# Patient Record
Sex: MALE | Race: OTHER | Employment: UNEMPLOYED | URBAN - METROPOLITAN AREA
[De-identification: names, ages, dates, MRNs, and addresses within clinical notes are randomized per-mention and may not be internally consistent; named-entity substitution may affect disease eponyms.]

---

## 2023-03-24 ENCOUNTER — APPOINTMENT (OUTPATIENT)
Dept: GENERAL RADIOLOGY | Age: 18
DRG: 862 | End: 2023-03-24
Attending: EMERGENCY MEDICINE
Payer: MEDICAID

## 2023-03-24 ENCOUNTER — HOSPITAL ENCOUNTER (INPATIENT)
Age: 18
LOS: 2 days | Discharge: HOME | DRG: 862 | End: 2023-03-27
Attending: EMERGENCY MEDICINE | Admitting: ANESTHESIOLOGY
Payer: MEDICAID

## 2023-03-24 DIAGNOSIS — D64.9 ANEMIA, UNSPECIFIED TYPE: ICD-10-CM

## 2023-03-24 DIAGNOSIS — K92.2 ACUTE UPPER GI BLEEDING: Primary | ICD-10-CM

## 2023-03-24 LAB
ALBUMIN SERPL-MCNC: 2.6 G/DL (ref 3.5–5)
ALBUMIN/GLOB SERPL: 0.7 (ref 1.1–2.2)
ALP SERPL-CCNC: 143 U/L (ref 45–117)
ALT SERPL-CCNC: 39 U/L (ref 12–78)
ANION GAP SERPL CALC-SCNC: 12 MMOL/L (ref 5–15)
AST SERPL-CCNC: 16 U/L (ref 15–37)
BASOPHILS # BLD: 0.1 K/UL (ref 0–0.1)
BASOPHILS NFR BLD: 1 % (ref 0–1)
BILIRUB SERPL-MCNC: 0.8 MG/DL (ref 0.2–1)
BUN SERPL-MCNC: 20 MG/DL (ref 6–20)
BUN/CREAT SERPL: 24 (ref 12–20)
CALCIUM SERPL-MCNC: 8 MG/DL (ref 8.5–10.1)
CHLORIDE SERPL-SCNC: 105 MMOL/L (ref 97–108)
CO2 SERPL-SCNC: 23 MMOL/L (ref 21–32)
CREAT SERPL-MCNC: 0.85 MG/DL (ref 0.7–1.3)
DIFFERENTIAL METHOD BLD: ABNORMAL
EOSINOPHIL # BLD: 0.4 K/UL (ref 0–0.4)
EOSINOPHIL NFR BLD: 3 % (ref 0–7)
ERYTHROCYTE [DISTWIDTH] IN BLOOD BY AUTOMATED COUNT: 13.9 % (ref 11.5–14.5)
GLOBULIN SER CALC-MCNC: 3.6 G/DL (ref 2–4)
GLUCOSE SERPL-MCNC: 177 MG/DL (ref 65–100)
HCT VFR BLD AUTO: 24.8 % (ref 36.6–50.3)
HGB BLD-MCNC: 7.5 G/DL (ref 12.1–17)
IMM GRANULOCYTES # BLD AUTO: 0 K/UL
IMM GRANULOCYTES NFR BLD AUTO: 0 %
INR PPP: 1.3 (ref 0.9–1.1)
LYMPHOCYTES # BLD: 5.6 K/UL (ref 0.8–3.5)
LYMPHOCYTES NFR BLD: 40 % (ref 12–49)
MCH RBC QN AUTO: 26.4 PG (ref 26–34)
MCHC RBC AUTO-ENTMCNC: 30.2 G/DL (ref 30–36.5)
MCV RBC AUTO: 87.3 FL (ref 80–99)
MONOCYTES # BLD: 1.1 K/UL (ref 0–1)
MONOCYTES NFR BLD: 8 % (ref 5–13)
NEUTS SEG # BLD: 6.9 K/UL (ref 1.8–8)
NEUTS SEG NFR BLD: 48 % (ref 32–75)
NRBC # BLD: 0 K/UL (ref 0–0.01)
NRBC BLD-RTO: 0 PER 100 WBC
PLATELET # BLD AUTO: 458 K/UL (ref 150–400)
PMV BLD AUTO: 10.1 FL (ref 8.9–12.9)
POTASSIUM SERPL-SCNC: 4.1 MMOL/L (ref 3.5–5.1)
PROT SERPL-MCNC: 6.2 G/DL (ref 6.4–8.2)
PROTHROMBIN TIME: 12.5 SEC (ref 9–11.1)
RBC # BLD AUTO: 2.84 M/UL (ref 4.1–5.7)
RBC MORPH BLD: ABNORMAL
SODIUM SERPL-SCNC: 140 MMOL/L (ref 136–145)
WBC # BLD AUTO: 14.1 K/UL (ref 4.1–11.1)

## 2023-03-24 PROCEDURE — 96361 HYDRATE IV INFUSION ADD-ON: CPT

## 2023-03-24 PROCEDURE — P9016 RBC LEUKOCYTES REDUCED: HCPCS

## 2023-03-24 PROCEDURE — 85025 COMPLETE CBC W/AUTO DIFF WBC: CPT

## 2023-03-24 PROCEDURE — 86900 BLOOD TYPING SEROLOGIC ABO: CPT

## 2023-03-24 PROCEDURE — 74011250636 HC RX REV CODE- 250/636: Performed by: EMERGENCY MEDICINE

## 2023-03-24 PROCEDURE — 86923 COMPATIBILITY TEST ELECTRIC: CPT

## 2023-03-24 PROCEDURE — 36415 COLL VENOUS BLD VENIPUNCTURE: CPT

## 2023-03-24 PROCEDURE — 99285 EMERGENCY DEPT VISIT HI MDM: CPT

## 2023-03-24 PROCEDURE — 80053 COMPREHEN METABOLIC PANEL: CPT

## 2023-03-24 PROCEDURE — 86920 COMPATIBILITY TEST SPIN: CPT

## 2023-03-24 PROCEDURE — 85610 PROTHROMBIN TIME: CPT

## 2023-03-24 PROCEDURE — 36430 TRANSFUSION BLD/BLD COMPNT: CPT

## 2023-03-24 PROCEDURE — 96360 HYDRATION IV INFUSION INIT: CPT

## 2023-03-24 PROCEDURE — 71045 X-RAY EXAM CHEST 1 VIEW: CPT

## 2023-03-24 RX ORDER — SODIUM CHLORIDE 9 MG/ML
250 INJECTION, SOLUTION INTRAVENOUS AS NEEDED
Status: DISCONTINUED | OUTPATIENT
Start: 2023-03-24 | End: 2023-03-27 | Stop reason: HOSPADM

## 2023-03-24 RX ADMIN — SODIUM CHLORIDE 250 ML: 9 INJECTION, SOLUTION INTRAVENOUS at 22:30

## 2023-03-24 RX ADMIN — SODIUM CHLORIDE 250 ML: 9 INJECTION, SOLUTION INTRAVENOUS at 23:20

## 2023-03-25 ENCOUNTER — APPOINTMENT (OUTPATIENT)
Dept: CT IMAGING | Age: 18
DRG: 862 | End: 2023-03-25
Attending: EMERGENCY MEDICINE
Payer: MEDICAID

## 2023-03-25 PROBLEM — K92.2 GIB (GASTROINTESTINAL BLEEDING): Status: ACTIVE | Noted: 2023-03-25

## 2023-03-25 LAB
ABO + RH BLD: NORMAL
ALBUMIN SERPL-MCNC: 2.2 G/DL (ref 3.5–5)
ALBUMIN/GLOB SERPL: 0.7 (ref 1.1–2.2)
ALP SERPL-CCNC: 118 U/L (ref 45–117)
ALT SERPL-CCNC: 28 U/L (ref 12–78)
ANION GAP SERPL CALC-SCNC: 6 MMOL/L (ref 5–15)
APTT PPP: 26.6 SEC (ref 22.1–31)
AST SERPL-CCNC: 14 U/L (ref 15–37)
BILIRUB SERPL-MCNC: 0.9 MG/DL (ref 0.2–1)
BLD PROD TYP BPU: NORMAL
BLD PROD TYP BPU: NORMAL
BLOOD GROUP ANTIBODIES SERPL: NORMAL
BPU ID: NORMAL
BPU ID: NORMAL
BUN SERPL-MCNC: 20 MG/DL (ref 6–20)
BUN/CREAT SERPL: 36 (ref 12–20)
CALCIUM SERPL-MCNC: 7.5 MG/DL (ref 8.5–10.1)
CHLORIDE SERPL-SCNC: 115 MMOL/L (ref 97–108)
CO2 SERPL-SCNC: 19 MMOL/L (ref 21–32)
COMMENT, HOLDF: NORMAL
CREAT SERPL-MCNC: 0.55 MG/DL (ref 0.7–1.3)
CROSSMATCH RESULT,%XM: NORMAL
ERYTHROCYTE [DISTWIDTH] IN BLOOD BY AUTOMATED COUNT: 14.5 % (ref 11.5–14.5)
FIBRINOGEN PPP-MCNC: 281 MG/DL (ref 200–475)
GLOBULIN SER CALC-MCNC: 3 G/DL (ref 2–4)
GLUCOSE SERPL-MCNC: 124 MG/DL (ref 65–100)
HCT VFR BLD AUTO: 34 % (ref 36.6–50.3)
HGB BLD-MCNC: 10.6 G/DL (ref 12.1–17)
HGB BLD-MCNC: 7.1 G/DL (ref 12.1–17)
HGB BLD-MCNC: 7.8 G/DL (ref 12.1–17)
INR PPP: 1.1 (ref 0.9–1.1)
MCH RBC QN AUTO: 28.3 PG (ref 26–34)
MCHC RBC AUTO-ENTMCNC: 31.2 G/DL (ref 30–36.5)
MCV RBC AUTO: 90.9 FL (ref 80–99)
NRBC # BLD: 0 K/UL (ref 0–0.01)
NRBC BLD-RTO: 0 PER 100 WBC
PLATELET # BLD AUTO: 185 K/UL (ref 150–400)
PMV BLD AUTO: 9.9 FL (ref 8.9–12.9)
POTASSIUM SERPL-SCNC: 4.5 MMOL/L (ref 3.5–5.1)
PROT SERPL-MCNC: 5.2 G/DL (ref 6.4–8.2)
PROTHROMBIN TIME: 11.4 SEC (ref 9–11.1)
RBC # BLD AUTO: 3.74 M/UL (ref 4.1–5.7)
SAMPLES BEING HELD,HOLD: NORMAL
SODIUM SERPL-SCNC: 140 MMOL/L (ref 136–145)
SPECIMEN EXP DATE BLD: NORMAL
STATUS OF UNIT,%ST: NORMAL
STATUS OF UNIT,%ST: NORMAL
THERAPEUTIC RANGE,PTTT: NORMAL SECS (ref 58–77)
UNIT DIVISION, %UDIV: 0
UNIT DIVISION, %UDIV: 0
WBC # BLD AUTO: 9.5 K/UL (ref 4.1–11.1)

## 2023-03-25 PROCEDURE — 85384 FIBRINOGEN ACTIVITY: CPT

## 2023-03-25 PROCEDURE — 85610 PROTHROMBIN TIME: CPT

## 2023-03-25 PROCEDURE — 74178 CT ABD&PLV WO CNTR FLWD CNTR: CPT

## 2023-03-25 PROCEDURE — 74011250636 HC RX REV CODE- 250/636: Performed by: NURSE PRACTITIONER

## 2023-03-25 PROCEDURE — 65610000006 HC RM INTENSIVE CARE

## 2023-03-25 PROCEDURE — 74011000636 HC RX REV CODE- 636

## 2023-03-25 PROCEDURE — P9016 RBC LEUKOCYTES REDUCED: HCPCS

## 2023-03-25 PROCEDURE — 80053 COMPREHEN METABOLIC PANEL: CPT

## 2023-03-25 PROCEDURE — 36415 COLL VENOUS BLD VENIPUNCTURE: CPT

## 2023-03-25 PROCEDURE — 93005 ELECTROCARDIOGRAM TRACING: CPT

## 2023-03-25 PROCEDURE — 74011250636 HC RX REV CODE- 250/636: Performed by: INTERNAL MEDICINE

## 2023-03-25 PROCEDURE — 36430 TRANSFUSION BLD/BLD COMPNT: CPT

## 2023-03-25 PROCEDURE — C9113 INJ PANTOPRAZOLE SODIUM, VIA: HCPCS | Performed by: INTERNAL MEDICINE

## 2023-03-25 PROCEDURE — 85730 THROMBOPLASTIN TIME PARTIAL: CPT

## 2023-03-25 PROCEDURE — 85018 HEMOGLOBIN: CPT

## 2023-03-25 PROCEDURE — 30233N1 TRANSFUSION OF NONAUTOLOGOUS RED BLOOD CELLS INTO PERIPHERAL VEIN, PERCUTANEOUS APPROACH: ICD-10-PCS | Performed by: ANESTHESIOLOGY

## 2023-03-25 PROCEDURE — 74011000636 HC RX REV CODE- 636: Performed by: RADIOLOGY

## 2023-03-25 PROCEDURE — P9012 CRYOPRECIPITATE EACH UNIT: HCPCS

## 2023-03-25 PROCEDURE — P9059 PLASMA, FRZ BETWEEN 8-24HOUR: HCPCS

## 2023-03-25 PROCEDURE — 85027 COMPLETE CBC AUTOMATED: CPT

## 2023-03-25 PROCEDURE — 74011000250 HC RX REV CODE- 250: Performed by: NURSE PRACTITIONER

## 2023-03-25 PROCEDURE — 74011000250 HC RX REV CODE- 250: Performed by: INTERNAL MEDICINE

## 2023-03-25 RX ORDER — ONDANSETRON 2 MG/ML
4 INJECTION INTRAMUSCULAR; INTRAVENOUS
Status: DISCONTINUED | OUTPATIENT
Start: 2023-03-25 | End: 2023-03-27 | Stop reason: HOSPADM

## 2023-03-25 RX ORDER — CALCIUM GLUCONATE 20 MG/ML
2 INJECTION, SOLUTION INTRAVENOUS
Status: COMPLETED | OUTPATIENT
Start: 2023-03-25 | End: 2023-03-25

## 2023-03-25 RX ORDER — ACETAMINOPHEN 650 MG/1
650 SUPPOSITORY RECTAL
Status: DISCONTINUED | OUTPATIENT
Start: 2023-03-25 | End: 2023-03-27 | Stop reason: HOSPADM

## 2023-03-25 RX ORDER — SODIUM CHLORIDE 0.9 % (FLUSH) 0.9 %
5-40 SYRINGE (ML) INJECTION EVERY 8 HOURS
Status: DISCONTINUED | OUTPATIENT
Start: 2023-03-25 | End: 2023-03-27 | Stop reason: HOSPADM

## 2023-03-25 RX ORDER — SODIUM CHLORIDE 0.9 % (FLUSH) 0.9 %
5-40 SYRINGE (ML) INJECTION AS NEEDED
Status: DISCONTINUED | OUTPATIENT
Start: 2023-03-25 | End: 2023-03-27 | Stop reason: HOSPADM

## 2023-03-25 RX ORDER — SODIUM CHLORIDE 9 MG/ML
250 INJECTION, SOLUTION INTRAVENOUS AS NEEDED
Status: DISCONTINUED | OUTPATIENT
Start: 2023-03-25 | End: 2023-03-27 | Stop reason: HOSPADM

## 2023-03-25 RX ORDER — ACETAMINOPHEN 325 MG/1
650 TABLET ORAL
Status: DISCONTINUED | OUTPATIENT
Start: 2023-03-25 | End: 2023-03-27 | Stop reason: HOSPADM

## 2023-03-25 RX ORDER — METOCLOPRAMIDE HYDROCHLORIDE 5 MG/ML
10 INJECTION INTRAMUSCULAR; INTRAVENOUS EVERY 6 HOURS
Status: DISCONTINUED | OUTPATIENT
Start: 2023-03-25 | End: 2023-03-27 | Stop reason: HOSPADM

## 2023-03-25 RX ORDER — ONDANSETRON 4 MG/1
4 TABLET, ORALLY DISINTEGRATING ORAL
Status: DISCONTINUED | OUTPATIENT
Start: 2023-03-25 | End: 2023-03-27 | Stop reason: HOSPADM

## 2023-03-25 RX ORDER — POLYETHYLENE GLYCOL 3350 17 G/17G
17 POWDER, FOR SOLUTION ORAL DAILY PRN
Status: DISCONTINUED | OUTPATIENT
Start: 2023-03-25 | End: 2023-03-27 | Stop reason: HOSPADM

## 2023-03-25 RX ADMIN — SODIUM CHLORIDE, POTASSIUM CHLORIDE, SODIUM LACTATE AND CALCIUM CHLORIDE 1000 ML: 600; 310; 30; 20 INJECTION, SOLUTION INTRAVENOUS at 06:40

## 2023-03-25 RX ADMIN — METOCLOPRAMIDE 10 MG: 5 INJECTION, SOLUTION INTRAMUSCULAR; INTRAVENOUS at 13:10

## 2023-03-25 RX ADMIN — SODIUM CHLORIDE, PRESERVATIVE FREE 40 MG: 5 INJECTION INTRAVENOUS at 08:49

## 2023-03-25 RX ADMIN — METOCLOPRAMIDE 10 MG: 5 INJECTION, SOLUTION INTRAMUSCULAR; INTRAVENOUS at 05:33

## 2023-03-25 RX ADMIN — SODIUM CHLORIDE, PRESERVATIVE FREE 40 MG: 5 INJECTION INTRAVENOUS at 21:18

## 2023-03-25 RX ADMIN — METOCLOPRAMIDE 10 MG: 5 INJECTION, SOLUTION INTRAMUSCULAR; INTRAVENOUS at 02:17

## 2023-03-25 RX ADMIN — CALCIUM GLUCONATE 2 G: 20 INJECTION, SOLUTION INTRAVENOUS at 01:13

## 2023-03-25 RX ADMIN — IOPAMIDOL 100 ML: 755 INJECTION, SOLUTION INTRAVENOUS at 00:49

## 2023-03-25 RX ADMIN — SODIUM CHLORIDE, POTASSIUM CHLORIDE, SODIUM LACTATE AND CALCIUM CHLORIDE 1000 ML: 600; 310; 30; 20 INJECTION, SOLUTION INTRAVENOUS at 14:12

## 2023-03-25 RX ADMIN — PANTOPRAZOLE SODIUM 80 MG: 40 INJECTION, POWDER, FOR SOLUTION INTRAVENOUS at 02:18

## 2023-03-25 RX ADMIN — METOCLOPRAMIDE 10 MG: 5 INJECTION, SOLUTION INTRAMUSCULAR; INTRAVENOUS at 17:44

## 2023-03-25 RX ADMIN — SODIUM CHLORIDE, PRESERVATIVE FREE 10 ML: 5 INJECTION INTRAVENOUS at 05:37

## 2023-03-25 RX ADMIN — CALCIUM GLUCONATE 2 G: 20 INJECTION, SOLUTION INTRAVENOUS at 02:17

## 2023-03-25 RX ADMIN — SODIUM CHLORIDE, PRESERVATIVE FREE 10 ML: 5 INJECTION INTRAVENOUS at 13:10

## 2023-03-25 RX ADMIN — SODIUM CHLORIDE, PRESERVATIVE FREE 10 ML: 5 INJECTION INTRAVENOUS at 21:19

## 2023-03-25 NOTE — PROGRESS NOTES
Agree with NP Xochilt's H&P from earlier this morning. Seen and evaluated this morning and resting comfortably without complaints. No further bleeding since early morning hours and repeat Hgb was 10.0. Discussed with GI ([facility]) and no indication for EGD right now given improvement and likely source was mucosal tear from removal of PEG tube on [address]. If bleeding reoccurs than plan would be for emergent EGD. At this time he is allowed to have clear liquid diet and GI will continue to follow. Explained to mother and family in detail.     Paris Reis, RODY, ACNP-[facility]
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
## 2023-03-25 NOTE — ED NOTES
Pt transport [facility]. Pt 2 Unit PRBCs [id number] completed on [facility] stretcher prior to exiting the facility. Pt remains in critical condition. Pt a/ox4 intermittently. VS as documented. Updated [place] RN that patient is on the way [facility], ALS. Updated total est output blood [id number] on transfer. Pt has had 2 units completed.

## 2023-03-25 NOTE — ED NOTES
TRANSFER - OUT REPORT:    Verbal report given to MO Choudhury(name) on Renu Melendrez  being transferred to ICU(unit) for routine progression of care       Report consisted of patients Situation, Background, Assessment and   Recommendations(SBAR). Information from the following report(s) SBAR, ED Summary, Intake/Output, MAR, and Recent Results was reviewed with the receiving nurse. Lines:   Peripheral IV 03/24/23 Left;Upper Antecubital (Active)   Site Assessment Clean, dry, & intact 03/24/23 2146   Phlebitis Assessment 0 03/24/23 2146   Infiltration Assessment 0 03/24/23 2146       Peripheral IV 03/25/23 Left Antecubital (Active)   Site Assessment Clean, dry, & intact 03/25/23 0018   Phlebitis Assessment 0 03/25/23 0018   Infiltration Assessment 0 03/25/23 0018   Dressing Status Clean, dry, & intact 03/25/23 0018   Dressing Type Transparent 03/25/23 0018   Hub Color/Line Status Pink 03/25/23 0018   Alcohol Cap Used No 03/25/23 0018       Peripheral IV 03/25/23 Right;Upper Arm (Active)   Site Assessment Clean, dry, & intact 03/25/23 0017   Phlebitis Assessment 0 03/25/23 0017   Infiltration Assessment 0 03/25/23 0017   Dressing Status Clean, dry, & intact 03/25/23 0017   Dressing Type Transparent 03/25/23 0017   Hub Color/Line Status Green 03/25/23 0017   Alcohol Cap Used No 03/25/23 0017        Opportunity for questions and clarification was provided.       Patient transported with:   Monitor  O2 @ 2 liters  Registered Nurse

## 2023-03-25 NOTE — ED NOTES
Blood emesis with clots at this time, 600-650ccs. Airway protected. MD called to bedside. PRBCs first unit open and pressure bagged into patient at this time per MD verbal at bedside. 2nd unit PRBCs 2320 spiked and gravity at bedside with pressure. Pt diaphoretic, int consciousness.
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
## 2023-03-25 NOTE — ED NOTES
Md at bedside for POC and plan to transport.  Pt a/ox4 and family part of conversation (mother and girlfriend)

## 2023-03-25 NOTE — ROUTINE PROCESS
0200: TRANSFER - IN REPORT:    Verbal report received from   [address] (name) on Berhane Diane  being received from ED (unit) for urgent transfer      Report consisted of patients Situation, Background, Assessment and   Recommendations(SBAR). Information from the following report(s) SBAR, Kardex, ED Summary, Procedure Summary, Intake/Output, MAR, Recent Results, Cardiac Rhythm ST, and Dual Neuro Assessment was reviewed with the receiving nurse. Opportunity for questions and clarification was provided. Assessment completed upon patients arrival to unit and care assumed. 0730: Bedside shift change report given to Nicole Ruiz RN (oncoming nurse) by Donnell Nunn RN (offgoing nurse). Report included the following information SBAR, Kardex, ED Summary, Intake/Output, MAR, Recent Results, Cardiac Rhythm ST, and Dual Neuro Assessment.

## 2023-03-25 NOTE — ROUTINE PROCESS
1930: Bedside shift change report given to Gabe Moise RN (oncoming nurse) by Carmen Brandon RN (offgoing nurse). Report included the following information SBAR, Kardex, ED Summary, Intake/Output, MAR, Recent Results, Cardiac Rhythm ST, and Dual Neuro Assessment. 0730: Bedside shift change report given to Elisa Iglesias RN (oncoming nurse) by Gabe Moise RN (offgoing nurse). Report included the following information SBAR, Kardex, ED Summary, Intake/Output, MAR, Recent Results, Cardiac Rhythm SR-ST, and Dual Neuro Assessment.
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
## 2023-03-25 NOTE — ED TRIAGE NOTES
Patient arrives via [facility] as a transfer from [place] with c/o vomiting bright red blood. Patient had trach removed 3 days ago and peg tube removed today. Patient started vomiting a couple hours after peg removal. Patient has vomited a total of 1100mLs at prior ER and in route to [facility].  Patient vomited another 500mL of bright red blood with clots on arrival.

## 2023-03-25 NOTE — ED NOTES
Emergent blood transfusion started at this time. 1 unit of O-neg started at this time.  Unit # [id number]

## 2023-03-25 NOTE — ED NOTES
Pt report called to [facility] at this time. All questions answered. Pt report being considered for peds.

## 2023-03-25 NOTE — ED NOTES
Pt arrives in critical condition from [facility]. Pt had PEG tube pulled today and vomited >450cc bright red blood with clots. Pt syncopized.  Arrives wide complex tachy, diaphoretic, pale, unstable vs.

## 2023-03-25 NOTE — PROGRESS NOTES
1600 Bedside and Verbal shift change report given to Jacqueline Hogan RN (oncoming nurse) by Gerda Gaines RN (offgoing nurse). Report included the following information SBAR, Kardex, ED Summary, Procedure Summary, Intake/Output, MAR, Recent Results, and Cardiac Rhythm S tach . Uneventful shift. 2000 Bedside and Verbal shift change report given to Liv Carson RN (oncoming nurse) by Jacqueline Hogan RN (offgoing nurse). Report included the following information SBAR, Kardex, ED Summary, Procedure Summary, Intake/Output, MAR, Recent Results, and Cardiac Rhythm S tach .

## 2023-03-26 LAB
ANION GAP SERPL CALC-SCNC: 4 MMOL/L (ref 5–15)
BLD PROD TYP BPU: NORMAL
BPU ID: NORMAL
BUN SERPL-MCNC: 6 MG/DL (ref 6–20)
BUN/CREAT SERPL: 13 (ref 12–20)
CALCIUM SERPL-MCNC: 8 MG/DL (ref 8.5–10.1)
CHLORIDE SERPL-SCNC: 113 MMOL/L (ref 97–108)
CO2 SERPL-SCNC: 23 MMOL/L (ref 21–32)
CREAT SERPL-MCNC: 0.47 MG/DL (ref 0.7–1.3)
ERYTHROCYTE [DISTWIDTH] IN BLOOD BY AUTOMATED COUNT: 14.6 % (ref 11.5–14.5)
GLUCOSE SERPL-MCNC: 88 MG/DL (ref 65–100)
HCT VFR BLD AUTO: 20.7 % (ref 36.6–50.3)
HGB BLD-MCNC: 7 G/DL (ref 12.1–17)
HGB BLD-MCNC: 7.7 G/DL (ref 12.1–17)
MCH RBC QN AUTO: 28.6 PG (ref 26–34)
MCHC RBC AUTO-ENTMCNC: 33.8 G/DL (ref 30–36.5)
MCV RBC AUTO: 84.5 FL (ref 80–99)
NRBC # BLD: 0 K/UL (ref 0–0.01)
NRBC BLD-RTO: 0 PER 100 WBC
PLATELET # BLD AUTO: 179 K/UL (ref 150–400)
PMV BLD AUTO: 9.7 FL (ref 8.9–12.9)
POTASSIUM SERPL-SCNC: 3.2 MMOL/L (ref 3.5–5.1)
RBC # BLD AUTO: 2.45 M/UL (ref 4.1–5.7)
SODIUM SERPL-SCNC: 140 MMOL/L (ref 136–145)
STATUS OF UNIT,%ST: NORMAL
UNIT DIVISION, %UDIV: 0
WBC # BLD AUTO: 4.3 K/UL (ref 4.1–11.1)

## 2023-03-26 PROCEDURE — 74011250637 HC RX REV CODE- 250/637: Performed by: NURSE PRACTITIONER

## 2023-03-26 PROCEDURE — C9113 INJ PANTOPRAZOLE SODIUM, VIA: HCPCS | Performed by: INTERNAL MEDICINE

## 2023-03-26 PROCEDURE — 36415 COLL VENOUS BLD VENIPUNCTURE: CPT

## 2023-03-26 PROCEDURE — 65270000046 HC RM TELEMETRY

## 2023-03-26 PROCEDURE — 85027 COMPLETE CBC AUTOMATED: CPT

## 2023-03-26 PROCEDURE — 74011250636 HC RX REV CODE- 250/636: Performed by: INTERNAL MEDICINE

## 2023-03-26 PROCEDURE — 74011000250 HC RX REV CODE- 250: Performed by: INTERNAL MEDICINE

## 2023-03-26 PROCEDURE — 74011000250 HC RX REV CODE- 250: Performed by: NURSE PRACTITIONER

## 2023-03-26 PROCEDURE — 85018 HEMOGLOBIN: CPT

## 2023-03-26 PROCEDURE — 80048 BASIC METABOLIC PNL TOTAL CA: CPT

## 2023-03-26 RX ADMIN — SODIUM CHLORIDE, PRESERVATIVE FREE 10 ML: 5 INJECTION INTRAVENOUS at 06:21

## 2023-03-26 RX ADMIN — METOCLOPRAMIDE 10 MG: 5 INJECTION, SOLUTION INTRAMUSCULAR; INTRAVENOUS at 11:28

## 2023-03-26 RX ADMIN — METOCLOPRAMIDE 10 MG: 5 INJECTION, SOLUTION INTRAMUSCULAR; INTRAVENOUS at 17:57

## 2023-03-26 RX ADMIN — ACETAMINOPHEN 650 MG: 325 TABLET ORAL at 18:15

## 2023-03-26 RX ADMIN — SODIUM CHLORIDE, PRESERVATIVE FREE 40 MG: 5 INJECTION INTRAVENOUS at 08:49

## 2023-03-26 RX ADMIN — SODIUM CHLORIDE, PRESERVATIVE FREE 10 ML: 5 INJECTION INTRAVENOUS at 17:58

## 2023-03-26 RX ADMIN — SODIUM CHLORIDE, PRESERVATIVE FREE 40 MG: 5 INJECTION INTRAVENOUS at 21:50

## 2023-03-26 RX ADMIN — METOCLOPRAMIDE 10 MG: 5 INJECTION, SOLUTION INTRAMUSCULAR; INTRAVENOUS at 00:01

## 2023-03-26 RX ADMIN — METOCLOPRAMIDE 10 MG: 5 INJECTION, SOLUTION INTRAMUSCULAR; INTRAVENOUS at 06:20

## 2023-03-26 RX ADMIN — SODIUM CHLORIDE, PRESERVATIVE FREE 10 ML: 5 INJECTION INTRAVENOUS at 21:51

## 2023-03-26 RX ADMIN — POTASSIUM BICARBONATE 40 MEQ: 782 TABLET, EFFERVESCENT ORAL at 06:20

## 2023-03-26 NOTE — PROGRESS NOTES
[address], [address] [place] [address]    GI PROGRESS NOTE    NAME: Troy Grande   :  2005   MRN:  [id number]       Subjective:   Two bowel movements yesterday that were dark in color. Otherwise feeling well. Tolerating clear liquid diet. Downward trend of Hgb. Review of Systems    Constitutional: negative fever, negative chills, negative weight loss  Eyes:   negative visual changes  ENT:   negative sore throat, tongue or lip swelling  Respiratory:  negative cough, negative dyspnea  Cards:  negative for chest pain, palpitations, lower extremity edema  GI:   See HPI  :  negative for frequency, dysuria  Integument:  negative for rash and pruritus  Heme:  negative for easy bruising and gum/nose bleeding  Musculoskel: negative for myalgias,  back pain and muscle weakness  Neuro: negative for headaches, dizziness, vertigo  Psych:  negative for feelings of anxiety, depression         Objective:     VITALS:   Last 24hrs VS reviewed since prior progress note. Most recent are:  Visit Vitals  BP (!) 146/89 (BP 1 Location: Right lower arm, BP Patient Position: At rest)   Pulse (!) 108   Temp 98.1 °F (36.7 °C)   Resp 26   Ht 6' (1.829 m)   Wt 63.6 kg (140 lb 3.4 oz)   SpO2 97%   BMI 19.02 kg/m²       Intake/Output Summary (Last 24 hours) at 3/26/2023 1039  Last data filed at 3/26/2023 0830  Gross per 24 hour   Intake 2169.5 ml   Output 1200 ml   Net 969.5 ml     PHYSICAL EXAM:  General: WD, WN. Alert, cooperative, no acute distress    HEENT: NC, Atraumatic. PERRLA, EOMI. Anicteric sclerae. Extremities: No c/c/e  Neurologic:  CN 2-12 gi, Alert and oriented X 3. No acute neurological distress   Psych:   Good insight. Not anxious nor agitated.     Lab Data Reviewed:   Recent Labs     23  0408 23  2308 23  1743 23  0336   WBC 4.3  --   --  9.5   HGB 7.0* 7.1*   < > 10.6*   HCT 20.7*  --   --  34.0*     --   --  185    < > = values in this interval not displayed. Recent Labs     03/26/23  0408 03/25/23  0009    140   K 3.2* 4.5   * 115*   CO2 23 19*   BUN 6 20   CREA 0.47* 0.55*   GLU 88 124*   CA 8.0* 7.5*     Recent Labs     03/25/23  0009 03/24/23  2150   * 143*   TP 5.2* 6.2*   ALB 2.2* 2.6*   GLOB 3.0 3.6       ________________________________________________________________________       Assessment:     Hematemesis following removal of PEG tube in setting of anticoagulation. Most likely cause of hematemesis is mucosal injury following PEG tube removal  Anemia - secondary to above. Hgb trending down     Plan:     Clear liquid diet  NPO after midnight for EGD tomorrow  Continue to hold anticoagulation as able  Continue PPI  Continue supportive measures  Monitor CBC. Consider PRBC transfusion if Hgb continues to trend downward  If he has recurrent hematemesis or overt GI bleeding, and/or hemodynamic instability, would plan on more urgent EGD over the weekend. Discussed plan with patient, family, and ICU team.  Dr. Balbir Griffith to assume care from GI tomorrow     Signed By: Babak Greene.  Tomi Lee MD     3/26/2023  10:39 AM

## 2023-03-26 NOTE — PROGRESS NOTES
Problem: General Medical Care Plan  Goal: *Vital signs within specified parameters  3/25/2023 2138 by Trisha Bueno RN  Outcome: Progressing Towards Goal  3/25/2023 0753 by Trisha Bueno RN  Outcome: Progressing Towards Goal  Goal: *Labs within defined limits  3/25/2023 2138 by Trisha Bueno RN  Outcome: Progressing Towards Goal  3/25/2023 0753 by Trisha Bueno RN  Outcome: Progressing Towards Goal  Goal: *Absence of infection signs and symptoms  3/25/2023 2138 by Trisha Bueno RN  Outcome: Progressing Towards Goal  3/25/2023 0753 by Trisha Bueno RN  Outcome: Progressing Towards Goal  Goal: *Optimal pain control at patient's stated goal  3/25/2023 2138 by Trisha Bueno RN  Outcome: Progressing Towards Goal  3/25/2023 0753 by Trisha Bueno RN  Outcome: Progressing Towards Goal  Goal: *Skin integrity maintained  3/25/2023 2138 by Trisha Bueno RN  Outcome: Progressing Towards Goal  3/25/2023 0753 by Trisha Bueno RN  Outcome: Progressing Towards Goal  Goal: *Fluid volume balance  3/25/2023 2138 by Trisha Bueno RN  Outcome: Progressing Towards Goal  3/25/2023 0753 by Trisha Bueno RN  Outcome: Progressing Towards Goal  Goal: *Optimize nutritional status  3/25/2023 2138 by Trisha Bueno RN  Outcome: Progressing Towards Goal  3/25/2023 0753 by Trisha Bueno RN  Outcome: Progressing Towards Goal  Goal: *Anxiety reduced or absent  3/25/2023 2138 by Trisha Bueno RN  Outcome: Progressing Towards Goal  3/25/2023 0753 by Trisha Bueno RN  Outcome: Progressing Towards Goal  Goal: *Progressive mobility and function (eg: ADL's)  3/25/2023 2138 by Trisha Bueno RN  Outcome: Progressing Towards Goal  3/25/2023 0753 by Trisha Bueno RN  Outcome: Progressing Towards Goal     Problem: Patient Education: Go to Patient Education Activity  Goal: Patient/Family Education  3/25/2023 2138 by Trisha Bueno RN  Outcome: Progressing Towards Goal  3/25/2023 0753 by Trisha Bueno, RN  Outcome: Progressing Towards Goal
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
## 2023-03-26 NOTE — PROGRESS NOTES
0730: Bedside shift change report given to Sherie Tafoya RN (oncoming nurse) by Shana De Jesus RN (offgoing nurse). Report included the following information SBAR, Intake/Output, Cardiac Rhythm NSR to ST, and Alarm Parameters . 1315: TRANSFER - OUT REPORT:    Verbal report given to [place], RN (name) on Yan Moore  being transferred to  (unit) for routine progression of care       Report consisted of patients Situation, Background, Assessment and   Recommendations(SBAR). Information from the following report(s) SBAR, Intake/Output, Cardiac Rhythm NSR to ST, and Alarm Parameters  was reviewed with the receiving nurse. Lines:   Peripheral IV 03/25/23 Left Antecubital (Active)   Site Assessment Clean, dry, & intact 03/26/23 1127   Phlebitis Assessment 0 03/26/23 1127   Infiltration Assessment 0 03/26/23 1127   Dressing Status Clean, dry, & intact 03/26/23 1127   Dressing Type Transparent;Tape 03/26/23 1127   Hub Color/Line Status Pink;Flushed;Capped 03/26/23 1127   Action Taken Open ports on tubing capped 03/26/23 1127   Alcohol Cap Used Yes 03/26/23 1127       Peripheral IV 03/25/23 Right;Upper Arm (Active)   Site Assessment Clean, dry, & intact 03/26/23 1127   Phlebitis Assessment 0 03/26/23 1127   Infiltration Assessment 0 03/26/23 1127   Dressing Status Clean, dry, & intact 03/26/23 1127   Dressing Type Transparent;Tape 03/26/23 1127   Hub Color/Line Status Green;Capped 03/26/23 1127   Action Taken Open ports on tubing capped 03/26/23 1127   Alcohol Cap Used Yes 03/26/23 1127        Opportunity for questions and clarification was provided. 1330: Patient transported to [facility] with:   Monitor  Registered Nurse  Patient transferred with his cell phone and .

## 2023-03-27 ENCOUNTER — ANESTHESIA EVENT (OUTPATIENT)
Dept: ENDOSCOPY | Age: 18
DRG: 862 | End: 2023-03-27
Payer: MEDICAID

## 2023-03-27 ENCOUNTER — ANESTHESIA (OUTPATIENT)
Dept: ENDOSCOPY | Age: 18
DRG: 862 | End: 2023-03-27
Payer: MEDICAID

## 2023-03-27 VITALS
SYSTOLIC BLOOD PRESSURE: 98 MMHG | RESPIRATION RATE: 28 BRPM | TEMPERATURE: 98.4 F | DIASTOLIC BLOOD PRESSURE: 57 MMHG | HEART RATE: 109 BPM | OXYGEN SATURATION: 97 % | WEIGHT: 144.84 LBS | HEIGHT: 72 IN | BODY MASS INDEX: 19.62 KG/M2

## 2023-03-27 LAB
ANION GAP SERPL CALC-SCNC: 3 MMOL/L (ref 5–15)
ATRIAL RATE: 110 BPM
BUN SERPL-MCNC: 4 MG/DL (ref 6–20)
BUN/CREAT SERPL: 8 (ref 12–20)
CALCIUM SERPL-MCNC: 8.6 MG/DL (ref 8.5–10.1)
CALCULATED P AXIS, ECG09: 50 DEGREES
CALCULATED R AXIS, ECG10: -73 DEGREES
CALCULATED T AXIS, ECG11: 37 DEGREES
CHLORIDE SERPL-SCNC: 110 MMOL/L (ref 97–108)
CO2 SERPL-SCNC: 27 MMOL/L (ref 21–32)
CREAT SERPL-MCNC: 0.53 MG/DL (ref 0.7–1.3)
DIAGNOSIS, 93000: NORMAL
ERYTHROCYTE [DISTWIDTH] IN BLOOD BY AUTOMATED COUNT: 14.6 % (ref 11.5–14.5)
GLUCOSE SERPL-MCNC: 97 MG/DL (ref 65–100)
HCT VFR BLD AUTO: 22.4 % (ref 36.6–50.3)
HGB BLD-MCNC: 7.7 G/DL (ref 12.1–17)
MCH RBC QN AUTO: 28.7 PG (ref 26–34)
MCHC RBC AUTO-ENTMCNC: 34.4 G/DL (ref 30–36.5)
MCV RBC AUTO: 83.6 FL (ref 80–99)
NRBC # BLD: 0 K/UL (ref 0–0.01)
NRBC BLD-RTO: 0 PER 100 WBC
P-R INTERVAL, ECG05: 134 MS
PLATELET # BLD AUTO: 204 K/UL (ref 150–400)
PMV BLD AUTO: 10 FL (ref 8.9–12.9)
POTASSIUM SERPL-SCNC: 3.5 MMOL/L (ref 3.5–5.1)
Q-T INTERVAL, ECG07: 342 MS
QRS DURATION, ECG06: 108 MS
QTC CALCULATION (BEZET), ECG08: 462 MS
RBC # BLD AUTO: 2.68 M/UL (ref 4.1–5.7)
SODIUM SERPL-SCNC: 140 MMOL/L (ref 136–145)
VENTRICULAR RATE, ECG03: 110 BPM
WBC # BLD AUTO: 4.2 K/UL (ref 4.1–11.1)

## 2023-03-27 PROCEDURE — 74011000250 HC RX REV CODE- 250: Performed by: INTERNAL MEDICINE

## 2023-03-27 PROCEDURE — 76060000031 HC ANESTHESIA FIRST 0.5 HR: Performed by: INTERNAL MEDICINE

## 2023-03-27 PROCEDURE — 85027 COMPLETE CBC AUTOMATED: CPT

## 2023-03-27 PROCEDURE — 74011000250 HC RX REV CODE- 250: Performed by: NURSE PRACTITIONER

## 2023-03-27 PROCEDURE — 74011000250 HC RX REV CODE- 250: Performed by: NURSE ANESTHETIST, CERTIFIED REGISTERED

## 2023-03-27 PROCEDURE — C9113 INJ PANTOPRAZOLE SODIUM, VIA: HCPCS | Performed by: INTERNAL MEDICINE

## 2023-03-27 PROCEDURE — 36415 COLL VENOUS BLD VENIPUNCTURE: CPT

## 2023-03-27 PROCEDURE — 0DJ08ZZ INSPECTION OF UPPER INTESTINAL TRACT, VIA NATURAL OR ARTIFICIAL OPENING ENDOSCOPIC: ICD-10-PCS | Performed by: INTERNAL MEDICINE

## 2023-03-27 PROCEDURE — 76040000019: Performed by: INTERNAL MEDICINE

## 2023-03-27 PROCEDURE — 74011250636 HC RX REV CODE- 250/636: Performed by: INTERNAL MEDICINE

## 2023-03-27 PROCEDURE — 80048 BASIC METABOLIC PNL TOTAL CA: CPT

## 2023-03-27 PROCEDURE — 74011250636 HC RX REV CODE- 250/636: Performed by: NURSE ANESTHETIST, CERTIFIED REGISTERED

## 2023-03-27 PROCEDURE — 2709999900 HC NON-CHARGEABLE SUPPLY: Performed by: INTERNAL MEDICINE

## 2023-03-27 RX ORDER — LIDOCAINE HYDROCHLORIDE 20 MG/ML
INJECTION, SOLUTION EPIDURAL; INFILTRATION; INTRACAUDAL; PERINEURAL AS NEEDED
Status: DISCONTINUED | OUTPATIENT
Start: 2023-03-27 | End: 2023-03-27 | Stop reason: HOSPADM

## 2023-03-27 RX ORDER — PANTOPRAZOLE SODIUM 40 MG/1
40 TABLET, DELAYED RELEASE ORAL 2 TIMES DAILY
Qty: 60 TABLET | Refills: 0 | Status: SHIPPED | OUTPATIENT
Start: 2023-03-27

## 2023-03-27 RX ORDER — FLUMAZENIL 0.1 MG/ML
0.2 INJECTION INTRAVENOUS
Status: DISCONTINUED | OUTPATIENT
Start: 2023-03-27 | End: 2023-03-27 | Stop reason: HOSPADM

## 2023-03-27 RX ORDER — MIDAZOLAM HYDROCHLORIDE 1 MG/ML
.25-5 INJECTION, SOLUTION INTRAMUSCULAR; INTRAVENOUS
Status: DISCONTINUED | OUTPATIENT
Start: 2023-03-27 | End: 2023-03-27 | Stop reason: HOSPADM

## 2023-03-27 RX ORDER — DEXTROMETHORPHAN/PSEUDOEPHED 2.5-7.5/.8
1.2 DROPS ORAL
Status: DISCONTINUED | OUTPATIENT
Start: 2023-03-27 | End: 2023-03-27 | Stop reason: HOSPADM

## 2023-03-27 RX ORDER — SODIUM CHLORIDE 9 MG/ML
75 INJECTION, SOLUTION INTRAVENOUS CONTINUOUS
Status: DISCONTINUED | OUTPATIENT
Start: 2023-03-27 | End: 2023-03-27 | Stop reason: HOSPADM

## 2023-03-27 RX ORDER — FENTANYL CITRATE 50 UG/ML
12.5-2 INJECTION, SOLUTION INTRAMUSCULAR; INTRAVENOUS
Status: DISCONTINUED | OUTPATIENT
Start: 2023-03-27 | End: 2023-03-27 | Stop reason: HOSPADM

## 2023-03-27 RX ORDER — PROPOFOL 10 MG/ML
INJECTION, EMULSION INTRAVENOUS AS NEEDED
Status: DISCONTINUED | OUTPATIENT
Start: 2023-03-27 | End: 2023-03-27 | Stop reason: HOSPADM

## 2023-03-27 RX ORDER — NALOXONE HYDROCHLORIDE 0.4 MG/ML
0.4 INJECTION, SOLUTION INTRAMUSCULAR; INTRAVENOUS; SUBCUTANEOUS
Status: DISCONTINUED | OUTPATIENT
Start: 2023-03-27 | End: 2023-03-27 | Stop reason: HOSPADM

## 2023-03-27 RX ORDER — SODIUM CHLORIDE 0.9 % (FLUSH) 0.9 %
5-40 SYRINGE (ML) INJECTION EVERY 8 HOURS
Status: DISCONTINUED | OUTPATIENT
Start: 2023-03-27 | End: 2023-03-27 | Stop reason: HOSPADM

## 2023-03-27 RX ORDER — SODIUM CHLORIDE 9 MG/ML
INJECTION, SOLUTION INTRAVENOUS
Status: DISCONTINUED | OUTPATIENT
Start: 2023-03-27 | End: 2023-03-27 | Stop reason: HOSPADM

## 2023-03-27 RX ORDER — SODIUM CHLORIDE 0.9 % (FLUSH) 0.9 %
5-40 SYRINGE (ML) INJECTION AS NEEDED
Status: DISCONTINUED | OUTPATIENT
Start: 2023-03-27 | End: 2023-03-27 | Stop reason: HOSPADM

## 2023-03-27 RX ORDER — EPINEPHRINE 0.1 MG/ML
1 INJECTION INTRACARDIAC; INTRAVENOUS
Status: DISCONTINUED | OUTPATIENT
Start: 2023-03-27 | End: 2023-03-27 | Stop reason: HOSPADM

## 2023-03-27 RX ORDER — ATROPINE SULFATE 0.1 MG/ML
0.5 INJECTION INTRAVENOUS
Status: DISCONTINUED | OUTPATIENT
Start: 2023-03-27 | End: 2023-03-27 | Stop reason: HOSPADM

## 2023-03-27 RX ADMIN — METOCLOPRAMIDE 10 MG: 5 INJECTION, SOLUTION INTRAMUSCULAR; INTRAVENOUS at 12:04

## 2023-03-27 RX ADMIN — PROPOFOL 50 MG: 10 INJECTION, EMULSION INTRAVENOUS at 14:05

## 2023-03-27 RX ADMIN — PROPOFOL 50 MG: 10 INJECTION, EMULSION INTRAVENOUS at 14:07

## 2023-03-27 RX ADMIN — PROPOFOL 150 MG: 10 INJECTION, EMULSION INTRAVENOUS at 14:03

## 2023-03-27 RX ADMIN — SODIUM CHLORIDE, PRESERVATIVE FREE 10 ML: 5 INJECTION INTRAVENOUS at 05:05

## 2023-03-27 RX ADMIN — SODIUM CHLORIDE: 900 INJECTION, SOLUTION INTRAVENOUS at 13:40

## 2023-03-27 RX ADMIN — LIDOCAINE HYDROCHLORIDE 40 MG: 20 INJECTION, SOLUTION EPIDURAL; INFILTRATION; INTRACAUDAL; PERINEURAL at 14:03

## 2023-03-27 RX ADMIN — SODIUM CHLORIDE, PRESERVATIVE FREE 40 MG: 5 INJECTION INTRAVENOUS at 08:42

## 2023-03-27 RX ADMIN — METOCLOPRAMIDE 10 MG: 5 INJECTION, SOLUTION INTRAMUSCULAR; INTRAVENOUS at 05:05
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
## 2023-03-27 NOTE — ROUTINE PROCESS
TRANSFER - IN REPORT:    Verbal report received from Olivia(name) on [place]  being received from [facility](unit) for ordered procedure      Report consisted of patients Situation, Background, Assessment and   Recommendations(SBAR). Information from the following report(s) SBAR was reviewed with the receiving nurse. Opportunity for questions and clarification was provided. Assessment completed upon patients arrival to unit and care assumed.

## 2023-03-27 NOTE — ANESTHESIA POSTPROCEDURE EVALUATION
Post-Anesthesia Evaluation and Assessment    Patient: Carmenza Brennan MRN: [id number]  SSN: [id number]    YOB: 2005  Age: 21 y.o. Sex: male      I have evaluated the patient and they are stable and ready for discharge from the PACU. Cardiovascular Function/Vital Signs  Visit Vitals  BP (!) 96/45   Pulse (!) 114   Temp 36.9 °C (98.4 °F)   Resp (!) 37   Ht 6' (1.829 m)   Wt 65.7 kg (144 lb 13.5 oz)   SpO2 98%   BMI 19.64 kg/m²       Patient is status post MAC anesthesia for Procedure(s):  ESOPHAGOGASTRODUODENOSCOPY (EGD). Nausea/Vomiting: None    Postoperative hydration reviewed and adequate. Pain:  Pain Scale 1: Numeric (0 - 10) (03/27/23 1416)  Pain Intensity 1: 0 (03/27/23 1200)   Managed    Neurological Status:   Neuro  Neurologic State: Alert (03/27/23 0800)  Orientation Level: Oriented X4 (03/27/23 0800)  Cognition: Appropriate decision making; Follows commands (03/27/23 0800)  Speech: Clear (03/27/23 0800)  Assessment L Pupil: Brisk;Round (03/27/23 0800)  Size L Pupil (mm): 3 (03/27/23 0800)  Assessment R Pupil: Brisk;Round (03/27/23 0800)  Size R Pupil (mm): 3 (03/27/23 0800)  LUE Motor Response: Purposeful;Spontaneous  (03/27/23 0800)  LLE Motor Response: Purposeful;Spontaneous  (03/27/23 0800)  RUE Motor Response: Purposeful;Spontaneous  (03/27/23 0800)  RLE Motor Response: Purposeful;Spontaneous  (03/27/23 0800)   At baseline    Mental Status, Level of Consciousness: Alert and  oriented to person, place, and time    Pulmonary Status:   O2 Device: None (Room air) (03/27/23 1416)   Adequate oxygenation and airway patent    Complications related to anesthesia: None    Post-anesthesia assessment completed. No concerns    Signed By: Chica Duvall MD     March 27, 2023              Procedure(s):  ESOPHAGOGASTRODUODENOSCOPY (EGD).     MAC    <[email or web address]>    INITIAL Post-op Vital signs:   Vitals Value Taken Time   BP 99/45 03/27/23 1420   Temp 36.9 °C (98.4 °F) 03/27/23 1416   Pulse 118 03/27/23 1420   Resp 37 03/27/23 1420   SpO2 97 % 03/27/23 1420   Vitals shown include unvalidated device data.
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
## 2023-03-27 NOTE — PROGRESS NOTES
Transition Of Care: Noted, EGD planned for today. The patient plans to discharge home with family to transport when stable for discharge. No CM needs or orders at this time. RUR: 10%    The patient was recently discharged from [address] [place] [address] and no home health was needed. The patient state that his follow up appointments are made. The patient is from home and lives with family. Hospitalist following. The patient plans to discharge home with family. Care Management Interventions  PCP Verified by CM: Yes  Palliative Care Criteria Met (RRAT>21 & CHF Dx)?: No  Mode of Transport at Discharge: Other (see comment)  Transition of Care Consult (CM Consult): Discharge Planning  MyChart Signup: No  Discharge Durable Medical Equipment: No  Health Maintenance Reviewed: Yes  Physical Therapy Consult: No  Occupational Therapy Consult: No  Speech Therapy Consult: No  Support Systems: Parent(s)  Confirm Follow Up Transport: Family  The Plan for Transition of Care is Related to the Following Treatment Goals : The patient plans to discharge home. The Patient and/or Patient Representative was Provided with a Choice of Provider and Agrees with the Discharge Plan?: Yes  Freedom of Choice List was Provided with Basic Dialogue that Supports the Patient's Individualized Plan of Care/Goals, Treatment Preferences and Shares the Quality Data Associated with the Providers?: Yes  [place] Resource Information Provided?: No  Discharge Location  Patient Expects to be Discharged to[de-identified] Home with family assistance     Reason for Admission:  Gastrointestinal bleeding                     RUR Score:   10%                  Plan for utilizing home health:    No indications at this time.        PCP: First and Last name:  Dr. Tamiko Koehler     Name of Practice:    Are you a current patient: Yes/No: Y   Approximate date of last visit: Feb, 2023   Can you participate in a virtual visit with your PCP: Y                    Current Advanced Directive/Advance Care Plan: Full Code      Healthcare Decision Maker:   Click here to complete HealthCare Decision Makers including selection of the Healthcare Decision Maker Relationship (ie \"Primary\")           Mother: Zuleyka Chapman: [phone]                  Transition of Care Plan:        CM met with the patient in room 216-1. The patient is alert and oriented x4. Confirmed demographics. The patient is independent, lives with family, drives a vehicle and uses [facility] pharmacy in [facility]. The patient was recently discharged from [facility] and had complications after a PEG tube removal. No home health needs at this time. The patient has follow up appointments scheduled and plans to discharge home with family to transport.  CM following for discharge needs      Param Sparks RN/CRM

## 2023-03-27 NOTE — ROUTINE PROCESS
TRANSFER - OUT REPORT:    Verbal report given to Melly(name) on Zenaida Ramirez  being transferred to [facility](unit) for routine post - op       Report consisted of patients Situation, Background, Assessment and   Recommendations(SBAR). Information from the following report(s) SBAR and Procedure Summary was reviewed with the receiving nurse. Lines:   Peripheral IV 03/25/23 Left Antecubital (Active)   Site Assessment Clean, dry, & intact 03/27/23 0800   Phlebitis Assessment 0 03/27/23 0800   Infiltration Assessment 0 03/27/23 0800   Dressing Status Clean, dry, & intact 03/27/23 0800   Dressing Type Transparent 03/27/23 0800   Hub Color/Line Status Pink;Flushed;Capped 03/27/23 0800   Action Taken Open ports on tubing capped 03/27/23 0800   Alcohol Cap Used Yes 03/27/23 0800       Peripheral IV 03/25/23 Right;Upper Arm (Active)   Site Assessment Clean, dry, & intact 03/27/23 0800   Phlebitis Assessment 0 03/27/23 0800   Infiltration Assessment 0 03/27/23 0800   Dressing Status Clean, dry, & intact 03/27/23 0800   Dressing Type Transparent 03/27/23 0800   Hub Color/Line Status Green;Flushed;Capped 03/27/23 0800   Action Taken Open ports on tubing capped 03/27/23 0800   Alcohol Cap Used Yes 03/27/23 0800        Opportunity for questions and clarification was provided.       Patient transported with:   erento

## 2023-03-27 NOTE — PROGRESS NOTES
TRANSFER - IN REPORT:    Verbal report received from Robina(name) on [place]  being received from [facility]) for routine post - op      Report consisted of patients Situation, Background, Assessment and   Recommendations(SBAR). Information from the following report(s) SBAR and Procedure Summary was reviewed with the receiving nurse. Opportunity for questions and clarification was provided. Assessment completed upon patients arrival to unit and care assumed.

## 2023-03-27 NOTE — DISCHARGE INSTRUCTIONS
Discharge Instructions       PATIENT ID: Elier Medellin  MRN: [id number]   YOB: 2005    DATE OF ADMISSION: [unfilled]    DATE OF DISCHARGE: 3/27/2023    PRIMARY CARE PROVIDER: @PCP@     ATTENDING PHYSICIAN: [unfilled]  DISCHARGING PROVIDER: Donna Ovreton MD    To contact this individual call [phone] and ask the  to page. If unavailable ask to be transferred the Adult Hospitalist Department. DISCHARGE DIAGNOSES GI bleed    CONSULTATIONS: GI    PROCEDURES/SURGERIES: * No surgery found *    PENDING TEST RESULTS:   At the time of discharge the following test results are still pending: none    FOLLOW UP APPOINTMENTS:   PCP  GI    ADDITIONAL CARE RECOMMENDATIONS: as above    DIET: Cardiac Diet      DISCHARGE MEDICATIONS:   See Medication Reconciliation Form    It is important that you take the medication exactly as they are prescribed. Keep your medication in the bottles provided by the pharmacist and keep a list of the medication names, dosages, and times to be taken in your wallet. Do not take other medications without consulting your doctor. NOTIFY YOUR PHYSICIAN FOR ANY OF THE FOLLOWING:   Fever over 101 degrees for 24 hours. Chest pain, shortness of breath, fever, chills, nausea, vomiting, diarrhea, change in mentation, falling, weakness, bleeding. Severe pain or pain not relieved by medications. Or, any other signs or symptoms that you may have questions about.       DISPOSITION:   x Home With:   OT  PT  HH  RN       SNF/Inpatient Rehab/LTAC    Independent/assisted living    Hospice    Other:     CDMP Checked:   Yes x     PROBLEM LIST Updated:  Yes x       Signed:   Donna Overton MD  3/27/2023  9:53 AM

## 2023-03-27 NOTE — DISCHARGE SUMMARY
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 . This patient: {bmidischarge:92384:::0}    PENDING TEST RESULTS:   At the time of discharge the following test results are still pending: ***     ADDITIONAL CARE RECOMMENDATIONS:   ***   DIET: {diet:11203}    ACTIVITY: {discharge activity:90851}    EQUIPMENT needed: ***    NOTIFY YOUR PHYSICIAN FOR ANY OF THE FOLLOWING:   Fever over 101 degrees for 24 hours. Chest pain, shortness of breath, fever, chills, nausea, vomiting, diarrhea, change in mentation, falling, weakness, bleeding. Severe pain or pain not relieved by medications, as well as any other signs or symptoms that you may have questions about.     FOLLOW UP APPOINTMENTS:    Follow-up Information       Follow up With Specialties Details Why Contact Info    PMD  Follow up in 1 week(s)      Alexia Nova MD Gastroenterology Follow up in 1 week(s)  [address]  [phone]               DISCHARGE MEDICATIONS:  Current Discharge Medication List        START taking these medications    Details   pantoprazole (Protonix) 40 mg tablet Take 1 Tablet by mouth two (2) times a day. Qty: 60 Tablet, Refills: 0  Start date: 3/27/2023             DISPOSITION:    Home With:   OT  PT  HH  RN       Long term SNF/Inpatient Rehab    Independent/assisted living    Hospice    Other:     PATIENT CONDITION AT DISCHARGE:     Functional status    Poor     Deconditioned     Independent      Cognition     Lucid     Forgetful     Dementia      Catheters/lines (plus indication)    Duque     PICC     PEG     None      Code status     Full code     DNR      PHYSICAL EXAMINATION AT DISCHARGE:    General:          Alert, cooperative, no distress, appears stated age. HEENT:           Atraumatic, anicteric sclerae, pink conjunctivae                          No oral ulcers, mucosa moist, throat clear, dentition fair  Neck:               Supple, symmetrical  Lungs:             Clear to auscultation bilaterally. No Wheezing or Rhonchi. No rales. Chest wall:      No tenderness  No Accessory muscle use. Heart:              Regular  rhythm,  No  murmur   No edema  Abdomen:        Soft, non-tender. Not distended. Bowel sounds normal  Extremities:     No cyanosis. No clubbing,                            Skin turgor normal, Capillary refill normal  Skin:                Not pale. Not Jaundiced  No rashes   Psych:             Not anxious or agitated.   Neurologic:      Alert, moves all extremities, answers questions appropriately and responds to commands       CHRONIC MEDICAL DIAGNOSES:  Problem List as of 3/27/2023 Never Reviewed            Codes Class Noted - Resolved    GIB (gastrointestinal bleeding) ICD-10-CM: K92.2  ICD-9-CM: 578.9  3/25/2023 - Present           Greater than *** minutes were spent with the patient on counseling and coordination of care    Signed:   Ra Hopkins MD  3/27/2023  9:54 AM    .

## 2023-03-27 NOTE — PROCEDURES
[address]  [place], [address]  [phone]           Endoscopic Gastroduodenoscopy Procedure Note    Yazmin Blocker  2005  [id number]    Indication:  Hematemesis      : Eliceo Wagner MD    Staff: Endoscopy Technician-1: Tao Paula  Endoscopy RN-1: Eric Bateman RN     Referring Provider:  None      Anesthesia/Sedation:  MAC anesthesia      Procedure Details     After infomed consent was obtained for the procedure, with all risks and benefits of procedure explained the patient was taken to the endoscopy suite and placed in the left lateral decubitus position. Following sequential administration of sedation as per above, the endoscope was inserted into the mouth and advanced under direct vision to second portion of the duodenum. A careful inspection was made as the gastroscope was withdrawn, including a retroflexed view of the proximal stomach; findings and interventions are described below. Findings:   Esophagus:normal  Stomach: Mild diffuse gastropathy without bleeding. Bilious (non-bloody) gastric contents. Prior PEG site noted with granulation tissue. No obvious associated bleeding or treatable lesion. Duodenum/jejunum: normal    Therapies:  none    Specimens: * No specimens in log *            EBL:  None    Complications:  None; patient tolerated the procedure well. Impression:      -Mild diffuse gastropathy without bleeding. Bilious (non-bloody) gastric contents. Prior PEG site noted with granulation tissue. No obvious associated bleeding or treatable lesion.  -Otherwise unremarkable exam    Recommendations:  -Return patient to hospital floor for ongoing care  -Resume normal medications  -No Non-Steroidal Anti Inflammatorys (NSAIDS)   -Continue acid suppression with PPI once daily for 6-8 weeks then as needed  -Okay to resume regular diet.  -We will sign off.  Please call back if further assistance needed      Eliceo Wagner MD  3/27/2023  2:10 PM

## 2023-03-28 LAB
ABO + RH BLD: NORMAL
BLD PROD TYP BPU: NORMAL
BLOOD GROUP ANTIBODIES SERPL: NORMAL
BPU ID: NORMAL
CROSSMATCH RESULT,%XM: NORMAL
SPECIMEN EXP DATE BLD: NORMAL
STATUS OF UNIT,%ST: NORMAL
UNIT DIVISION, %UDIV: 0

## 2023-03-28 PROCEDURE — 74011000636 HC RX REV CODE- 636

## (undated) DEVICE — TUBING HYDR IRR --